# Patient Record
Sex: MALE | Race: WHITE | NOT HISPANIC OR LATINO | Employment: STUDENT | URBAN - METROPOLITAN AREA
[De-identification: names, ages, dates, MRNs, and addresses within clinical notes are randomized per-mention and may not be internally consistent; named-entity substitution may affect disease eponyms.]

---

## 2023-06-17 ENCOUNTER — OFFICE VISIT (OUTPATIENT)
Dept: URGENT CARE | Facility: CLINIC | Age: 28
End: 2023-06-17
Payer: COMMERCIAL

## 2023-06-17 VITALS
OXYGEN SATURATION: 99 % | WEIGHT: 205.9 LBS | DIASTOLIC BLOOD PRESSURE: 68 MMHG | BODY MASS INDEX: 27.29 KG/M2 | TEMPERATURE: 97.8 F | HEART RATE: 68 BPM | SYSTOLIC BLOOD PRESSURE: 112 MMHG | RESPIRATION RATE: 14 BRPM | HEIGHT: 73 IN
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
## 2023-06-17 PROCEDURE — 3078F DIAST BP <80 MM HG: CPT

## 2023-06-17 PROCEDURE — 3074F SYST BP LT 130 MM HG: CPT

## 2023-06-17 PROCEDURE — 99203 OFFICE O/P NEW LOW 30 MIN: CPT

## 2023-06-17 RX ORDER — AMOXICILLIN AND CLAVULANATE POTASSIUM 875; 125 MG/1; MG/1
1 TABLET, FILM COATED ORAL 2 TIMES DAILY
Qty: 14 TABLET | Refills: 0 | Status: SHIPPED | OUTPATIENT
Start: 2023-06-17 | End: 2023-06-24

## 2023-06-18 NOTE — PROGRESS NOTES
"Subjective:   Jhoana Franklin is a 28 y.o. male who presents for Dog Bite (X 11 am this am on top of R wrist. Last Tdap . )      HPI: This is a 28-year-old male who presents today for evaluation of dog bite.  Patient reports sustaining dog bite to his right wrist from a friend's dog at 1130 this morning.  He reports that dog is vaccinated.  Patient also reports that his tetanus was updated in February 2023.  He denies pain to area.  He does report that he washed the area out with over-the-counter antiseptics and applied topical antibiotic ointment.  No other complaints to report today.      Review of Systems   Constitutional:  Negative for fever.   Musculoskeletal:         + Dog bite to right wrist   Neurological:  Negative for tingling.   All other systems reviewed and are negative.      Medications:    No current outpatient medications on file prior to visit.     No current facility-administered medications on file prior to visit.        Allergies:   Patient has no known allergies.    Problem List:   There is no problem list on file for this patient.       Surgical History:  No past surgical history on file.    Past Social Hx:   Social History     Tobacco Use    Smoking status: Never    Smokeless tobacco: Never   Vaping Use    Vaping Use: Never used   Substance Use Topics    Alcohol use: Yes    Drug use: Never          Problem list, medications, and allergies reviewed by myself today in Epic.     Objective:     /68   Pulse 68   Temp 36.6 °C (97.8 °F) (Temporal)   Resp 14   Ht 1.854 m (6' 1\")   Wt 93.4 kg (205 lb 14.4 oz)   SpO2 99%   BMI 27.17 kg/m²     Physical Exam  Vitals and nursing note reviewed.   Constitutional:       General: He is not in acute distress.     Appearance: Normal appearance. He is normal weight. He is not ill-appearing, toxic-appearing or diaphoretic.   HENT:      Head: Normocephalic and atraumatic.   Cardiovascular:      Rate and Rhythm: Normal rate and regular rhythm.      " Pulses: Normal pulses.      Heart sounds: Normal heart sounds. No murmur heard.     No friction rub. No gallop.   Pulmonary:      Effort: Pulmonary effort is normal. No respiratory distress.      Breath sounds: Normal breath sounds. No stridor. No wheezing, rhonchi or rales.   Chest:      Chest wall: No tenderness.   Skin:     General: Skin is warm and dry.      Capillary Refill: Capillary refill takes less than 2 seconds.             Comments: RUE. approximately 1.5 cm avulsion to dorsal aspect of wrist with subcutaneous tissue exposed. +1 puncture wound dorsal wrist.  Bleeding is controlled.  Full range of motion.  Neurovascular intact   Neurological:      General: No focal deficit present.      Mental Status: He is alert and oriented to person, place, and time. Mental status is at baseline.   Psychiatric:         Mood and Affect: Mood normal.         Behavior: Behavior normal.         Thought Content: Thought content normal.         Judgment: Judgment normal.         Assessment/Plan:     Diagnosis and associated orders:   1. Dog bite, initial encounter  amoxicillin-clavulanate (AUGMENTIN) 875-125 MG Tab             Comments/MDM:   Pt is clinically stable at today's acute urgent care visit.  No acute distress noted. Appropriate for outpatient management at this time.     Acute problem.  Patient presents today for evaluation of dog bite to his right wrist.  His tetanus is currently up-to-date.  Wound was thoroughly cleansed with Hibiclens and irrigated with normal saline, topical antibiotic ointment applied, nonadherent dressing applied, with Coban.  I have discussed wound care with patient.  He is to begin taking Augmentin.  Patient was given return precautions for any new or worsening signs or symptoms or for any sign of infection develops.  Patient is agreeable this plan of care and verbalizes good understanding.           Discussed DDx, management options (risks,benefits, and alternatives to planned treatment),  natural progression and supportive care.  Expressed understanding and the treatment plan was agreed upon. Questions were encouraged and answered   Return to urgent care prn if new or worsening sx or if there is no improvement in condition prn.    Educated in Red flags and indications to immediately call 911 or present to the Emergency Department.   Advised the patient to follow-up with the primary care physician for recheck, reevaluation, and consideration of further management.    I personally reviewed prior external notes and test results pertinent to today's visit.  I have independently reviewed and interpreted all diagnostics ordered during this urgent care acute visit.     Please note that this dictation was created using voice recognition software. I have made a reasonable attempt to correct obvious errors, but I expect that there are errors of grammar and possibly content that I did not discover before finalizing the note.    This note was electronically signed by DINORAH Zelaya

## 2023-06-19 ASSESSMENT — ENCOUNTER SYMPTOMS
TINGLING: 0
FEVER: 0

## 2025-04-08 ENCOUNTER — OFFICE VISIT (OUTPATIENT)
Dept: URGENT CARE | Facility: CLINIC | Age: 30
End: 2025-04-08
Payer: COMMERCIAL

## 2025-04-08 VITALS
SYSTOLIC BLOOD PRESSURE: 108 MMHG | BODY MASS INDEX: 26.24 KG/M2 | TEMPERATURE: 97.4 F | WEIGHT: 198 LBS | RESPIRATION RATE: 16 BRPM | DIASTOLIC BLOOD PRESSURE: 60 MMHG | HEART RATE: 77 BPM | OXYGEN SATURATION: 100 % | HEIGHT: 73 IN

## 2025-04-08 DIAGNOSIS — A09 DIARRHEA OF INFECTIOUS ORIGIN: ICD-10-CM

## 2025-04-08 DIAGNOSIS — R11.0 NAUSEA: ICD-10-CM

## 2025-04-08 PROCEDURE — 3074F SYST BP LT 130 MM HG: CPT

## 2025-04-08 PROCEDURE — 3078F DIAST BP <80 MM HG: CPT

## 2025-04-08 PROCEDURE — 99214 OFFICE O/P EST MOD 30 MIN: CPT

## 2025-04-08 RX ORDER — AZITHROMYCIN 500 MG/1
500 TABLET, FILM COATED ORAL DAILY
Qty: 2 TABLET | Refills: 0 | Status: SHIPPED | OUTPATIENT
Start: 2025-04-08

## 2025-04-08 RX ORDER — ONDANSETRON 4 MG/1
4 TABLET, ORALLY DISINTEGRATING ORAL EVERY 6 HOURS PRN
Qty: 15 TABLET | Refills: 0 | Status: SHIPPED | OUTPATIENT
Start: 2025-04-08

## 2025-04-08 ASSESSMENT — ENCOUNTER SYMPTOMS
DIARRHEA: 1
CHILLS: 0
ABDOMINAL PAIN: 1
VOMITING: 1
FEVER: 0
NAUSEA: 1

## 2025-04-08 NOTE — PROGRESS NOTES
CHIEF COMPLAINT  Chief Complaint   Patient presents with    Diarrhea     Patient is here today for diarrhea, nausea, vomiting, GI problems. Patient states that symptoms started 2 Sundays ago. Diarrhea on and off for about a week     Subjective:   Jhoana Franklin is a 30 y.o. male who presents to urgent care with concerns for symptoms of persistent watery diarrhea on and off x 1.5 weeks.  Patient also reports associated symptoms of intermittent nausea and vomiting as well as mild abdominal cramping and bloating.  He reports that symptoms started after a recent trip to [place].  He denies any symptoms of fever or chills.  No blood to stool.  No symptoms of cough or congestion.  Denies any other pertinent sick contacts.  Patient denies any use of recent antibiotics.  He does report that he has been tolerating sips of fluids as well as a bland diet.  He reports alleviating some symptoms of diarrhea with Imodium.  No other pertinent history.     Review of Systems   Constitutional:  Negative for chills and fever.   Gastrointestinal:  Positive for abdominal pain, diarrhea, nausea and vomiting.       PAST MEDICAL HISTORY  There are no active problems to display for this patient.      SURGICAL HISTORY  patient denies any surgical history    ALLERGIES  No Known Allergies    CURRENT MEDICATIONS  Home Medications       Reviewed by Himanshu Workman'rosangela (Medical Assistant) on 04/08/25 at 0948  Med List Status: <None>     Medication Last Dose Status        Patient Mg Taking any Medications                           SOCIAL HISTORY  Social History     Tobacco Use    Smoking status: Never    Smokeless tobacco: Never   Vaping Use    Vaping status: Never Used   Substance and Sexual Activity    Alcohol use: Yes    Drug use: Never    Sexual activity: Not on file       FAMILY HISTORY  No family history on file.      Medications, Allergies, and current problem list reviewed today in Epic.     Objective:     /60   Pulse 77   Temp  "36.3 °C (97.4 °F) (Temporal)   Resp 16   Ht 1.854 m (6' 1\")   Wt 89.8 kg (198 lb)   SpO2 100%     Physical Exam  Vitals reviewed.   Constitutional:       General: He is not in acute distress.     Appearance: Normal appearance. He is not ill-appearing or toxic-appearing.   HENT:      Head: Normocephalic.      Mouth/Throat:      Mouth: Mucous membranes are moist.      Pharynx: Oropharynx is clear.   Cardiovascular:      Rate and Rhythm: Normal rate and regular rhythm.   Pulmonary:      Effort: Pulmonary effort is normal.   Abdominal:      General: Abdomen is flat. Bowel sounds are increased. There is no distension.      Palpations: Abdomen is soft. There is no hepatomegaly, splenomegaly or mass.      Tenderness: There is no abdominal tenderness. There is no guarding or rebound.      Hernia: No hernia is present.   Skin:     General: Skin is warm.   Neurological:      Mental Status: He is alert.         Assessment/Plan:     Diagnosis and associated orders:     1. Diarrhea of infectious origin  azithromycin (ZITHROMAX) 500 MG tablet      2. Nausea  ondansetron (ZOFRAN ODT) 4 MG TABLET DISPERSIBLE         Comments/MDM:     Patient presents to urgent care with 1 week history of watery diarrhea, nausea, vomiting, bloating and mild abdominal cramping.  Denies any fever or chills.  No blood to stool.  Reports symptoms started after a recent trip to [place].  Given history and presentation suspicion for potential infectious origin of diarrhea.  Will send in azithromycin for treatment.  Counseled on appropriate medication administration.  Continue with clear/bland diet to aid in bowel rest.  Strict ER and return precautions discussed.  Patient feels comfortable with plan.         Differential diagnosis, natural history, supportive care, and indications for immediate follow-up discussed.    Advised the patient to follow-up with the primary care physician for recheck, reevaluation, and consideration of further " management.    Please note that this dictation was created using voice recognition software. I have made a reasonable attempt to correct obvious errors, but I expect that there are errors of grammar and possibly content that I did not discover before finalizing the note.    This note was electronically signed by MARTHA Smith